# Patient Record
Sex: FEMALE | Race: BLACK OR AFRICAN AMERICAN | Employment: FULL TIME | URBAN - METROPOLITAN AREA
[De-identification: names, ages, dates, MRNs, and addresses within clinical notes are randomized per-mention and may not be internally consistent; named-entity substitution may affect disease eponyms.]

---

## 2023-10-11 ENCOUNTER — EVALUATION (OUTPATIENT)
Dept: PHYSICAL THERAPY | Facility: CLINIC | Age: 48
End: 2023-10-11
Payer: COMMERCIAL

## 2023-10-11 DIAGNOSIS — S83.411D SPRAIN OF MEDIAL COLLATERAL LIGAMENT OF RIGHT KNEE, SUBSEQUENT ENCOUNTER: ICD-10-CM

## 2023-10-11 DIAGNOSIS — S83.411A MCL SPRAIN OF RIGHT KNEE: Primary | ICD-10-CM

## 2023-10-11 PROCEDURE — 97161 PT EVAL LOW COMPLEX 20 MIN: CPT | Mod: GP

## 2023-10-11 PROCEDURE — 97110 THERAPEUTIC EXERCISES: CPT | Mod: GP

## 2023-10-11 ASSESSMENT — ENCOUNTER SYMPTOMS
OCCASIONAL FEELINGS OF UNSTEADINESS: 0
LOSS OF SENSATION IN FEET: 0
DEPRESSION: 0

## 2023-10-11 NOTE — LETTER
October 11, 2023     Patient: Robert MÁRQUEZ   YOB: 1975   Date of Visit: 10/11/2023       To Whom it May Concern:    Robert MÁRQUEZ was seen in my clinic on 10/11/2023. She {Return to school/sport:81351}.    If you have any questions or concerns, please don't hesitate to call.         Sincerely,          Edwar Ma, PT        CC: No Recipients

## 2023-10-11 NOTE — PROGRESS NOTES
Physical Therapy    Physical Therapy Evaluation and Treatment      Patient Name: Robert MÁRQUEZ  MRN: [id number]  Today's Date: 10/11/2023  Time Calculation  Start Time: 0930  Stop Time: 1010  Time Calculation (min): 40 min    Visit: 1    Assessment:  Assessment Comment: Patient presents with signs and symptoms consistent with the medical diagnosis of a right knee MCL sprain. Her pain is negatively effecting her ability to perform weight bearing activity such as ambulation or stairs. She will benefit from medically necessary skilled physical therapy interventions in order to decrease pain and improve function with daily activities.    Plan:  Treatment/Interventions: Cryotherapy, Education/ Instruction, Electrical stimulation, Hot pack, Manual therapy, Neuromuscular re-education, Therapeutic activities, Therapeutic exercises  PT Plan: Skilled PT  PT Frequency: 1 time per week  Duration: 8 weeks  Onset Date: 08/30/23  Number of Treatments Authorized: 30  Rehab Potential: Excellent  Plan of Care Agreement: Patient    Current Problem:   1. MCL sprain of right knee  Follow Up In Physical Therapy    Follow Up In Physical Therapy      2. Sprain of medial collateral ligament of right knee, subsequent encounter [S82.241B]            Subjective    General:  General  Reason for Referral: right knee pain  Referred By: Carlos Jin  General Comment: Patient is a 47 y/o female who was referred to outpatient physical therapy due to right knee pain. Symptoms began on 8/30/23 are she was doing a high impact step aerobics class. Location of right knee pain is along the medial aspect. She notes that she was doing these classes 3 times per week. Patient rates her pain at 2/10 at rest but can get to 7/10 at the worst when the knee stiffens up at night. Patient works as a nurse. She reports that she is not exercising currently. Patient denies any numbness, tingling or recent falls.  Precautions:   None     Pain:    "2-7/10    Prior Level of Function:   Independent with ADLs.     Objective   Cognition:   WNL  General Assessments:  Range of Motion Comments: Left knee ROM: 0-0-132, Right knee ROM 0-0-140    Strength Comments: LE strength (R/L)  Hip flexion 4+/5, 4+/5  Hip extension 4+/5, 4+/5  Hip abduction 4+/5, 4+/5  Knee flexion 4/5, 5/5  Knee extension 4/5, 5/5  Ankle plantarflexion 5/5, 5/5  Ankle dorsiflexion 5/5, 5/5    Palpation Comment: Patient is tender to palpation along the medial joint line of the right knee.   Special Tests Comment: Anterior drawer negative, posterior drawer negative, varus stress negative, valgus stress positive, mcmurrary’s positive  Functional Assessments:  Gait Comment: Patient ambulates with a normalized gait pattern.    Outcome Measures:  LEFS: 61/80    Treatments:  Therapeutic Exercise  Therapeutic Exercise Performed: Yes  Therapeutic Exercise Activity 1: standing gastroc stretch 3 x 30\"  Therapeutic Exercise Activity 2: standing hamstring stretch 3 x 30\"  Therapeutic Exercise Activity 3: bridge 2 x 10 x 3\"  Therapeutic Exercise Activity 4: SLR 2 x 10  Therapeutic Exercise Activity 5: SL clamshells with red TB 2 x 10  Therapeutic Exercise Activity 6: SL hip abduction 2 x 10    Goals:  Encounter Problems       Encounter Problems (Active)       PT Problem       Patient will report improvement via the LEFS to show improved activity tolerance.        Start:  10/11/23    Expected End:  01/09/24            Patient will report compliance with her home exercise program.        Start:  10/11/23    Expected End:  01/09/24            Patient will demonstrate improvements in right LE strength to 5/5 in all motions to decrease pain with weight bearing activity such as stairs and ambulation.        Start:  10/11/23    Expected End:  01/09/24                         "

## 2023-10-11 NOTE — LETTER
October 11, 2023     Patient: Robert MÁRQUEZ   YOB: 1975   Date of Visit: 10/11/2023       To Whom It May Concern:    It is my medical opinion that Robert MÁRQUEZ {Work release (duty restriction):00242}.    If you have any questions or concerns, please don't hesitate to call.         Sincerely,        Edwar Ma, PT    CC: No Recipients

## 2023-10-17 ENCOUNTER — APPOINTMENT (OUTPATIENT)
Dept: PHYSICAL THERAPY | Facility: CLINIC | Age: 48
End: 2023-10-17
Payer: COMMERCIAL

## 2023-10-20 ENCOUNTER — APPOINTMENT (OUTPATIENT)
Dept: PHYSICAL THERAPY | Facility: CLINIC | Age: 48
End: 2023-10-20
Payer: COMMERCIAL

## 2023-10-23 ENCOUNTER — APPOINTMENT (OUTPATIENT)
Dept: PHYSICAL THERAPY | Facility: CLINIC | Age: 48
End: 2023-10-23
Payer: COMMERCIAL

## 2023-10-27 ENCOUNTER — APPOINTMENT (OUTPATIENT)
Dept: PHYSICAL THERAPY | Facility: CLINIC | Age: 48
End: 2023-10-27
Payer: COMMERCIAL

## 2023-10-30 ENCOUNTER — APPOINTMENT (OUTPATIENT)
Dept: PHYSICAL THERAPY | Facility: CLINIC | Age: 48
End: 2023-10-30
Payer: COMMERCIAL

## 2024-06-11 ENCOUNTER — OFFICE VISIT (OUTPATIENT)
Dept: OBSTETRICS AND GYNECOLOGY | Facility: CLINIC | Age: 49
End: 2024-06-11
Payer: COMMERCIAL

## 2024-06-11 VITALS
SYSTOLIC BLOOD PRESSURE: 110 MMHG | BODY MASS INDEX: 31.86 KG/M2 | DIASTOLIC BLOOD PRESSURE: 82 MMHG | HEIGHT: 63 IN | WEIGHT: 179.8 LBS

## 2024-06-11 DIAGNOSIS — R92.8 ABNORMAL MAMMOGRAM OF LEFT BREAST: Primary | ICD-10-CM

## 2024-06-11 DIAGNOSIS — Z01.419 WELL WOMAN EXAM WITH ROUTINE GYNECOLOGICAL EXAM: ICD-10-CM

## 2024-06-11 PROCEDURE — 99386 PREV VISIT NEW AGE 40-64: CPT | Performed by: OBSTETRICS & GYNECOLOGY

## 2024-06-11 ASSESSMENT — PAIN SCALES - GENERAL: PAINLEVEL: 0-NO PAIN

## 2024-06-11 ASSESSMENT — PATIENT HEALTH QUESTIONNAIRE - PHQ9
2. FEELING DOWN, DEPRESSED OR HOPELESS: NOT AT ALL
SUM OF ALL RESPONSES TO PHQ9 QUESTIONS 1 AND 2: 0
1. LITTLE INTEREST OR PLEASURE IN DOING THINGS: NOT AT ALL

## 2024-06-11 ASSESSMENT — ENCOUNTER SYMPTOMS
OCCASIONAL FEELINGS OF UNSTEADINESS: 0
LOSS OF SENSATION IN FEET: 0
DEPRESSION: 0

## 2024-06-11 NOTE — PROGRESS NOTES
Subjective   Robert Antony is a 48 y.o.  female who presents for annual exam. The patient has no complaints today. The patient is sexually active. GYN screening history: last pap: was normal. The patient is taking hormone replacement therapy. Patient denies post-menopausal vaginal bleeding.. The patient wears seatbelts: yes. The patient participates in regular exercise: yes. Has the patient ever been transfused or tattooed?: not asked. The patient reports that there is not domestic violence in their life.  Hysterectomu 2019 due to AUB, adenomyosis, endometriosis and fibroids.  No pain.  Had mammogram from [facility], needs additional imaging.     Menstrual History:  OB History          4    Para   3    Term                AB   1    Living   3         SAB        IAB   1    Ectopic        Multiple        Live Births   3                No LMP recorded. Patient has had a hysterectomy.         History reviewed. No pertinent past medical history.    Past Surgical History:   Procedure Laterality Date    BREAST SURGERY Bilateral 2020    breast augmentation    HYSTERECTOMY          No Known Allergies      Family History   Problem Relation Name Age of Onset    No Known Problems Mother      No Known Problems Father      Breast cancer Maternal Grandmother  73        Social History     Socioeconomic History    Marital status: Single     Spouse name: Not on file    Number of children: 3    Years of education: Not on file    Highest education level: Not on file   Occupational History    Not on file   Tobacco Use    Smoking status: Never    Smokeless tobacco: Never   Vaping Use    Vaping status: Never Used   Substance and Sexual Activity    Alcohol use: Not Currently    Drug use: Never    Sexual activity: Yes     Birth control/protection: Female Sterilization   Other Topics Concern    Not on file   Social History Narrative    Not on file     Social Determinants of Health     Financial  "Resource Strain: Low Risk  (7/15/2020)    Received from [facility]    Overall Financial Resource Strain (CARDIA)     Difficulty of Paying Living Expenses: Not hard at all   Food Insecurity: Unknown (7/15/2020)    Received from [facility]    Hunger Vital Sign     Worried About Running Out of Food in the Last Year: Patient declined     Ran Out of Food in the Last Year: Patient declined   Transportation Needs: Unknown (7/15/2020)    Received from [facility]    PRAPARE - Transportation     Lack of Transportation (Medical): Patient declined     Lack of Transportation (Non-Medical): Patient declined   Physical Activity: Not on file   Stress: Not on file   Social Connections: Not on file   Intimate Partner Violence: Not on file   Housing Stability: Not on file            Objective   /82   Ht 1.6 m (5' 3\")   Wt 81.6 kg (179 lb 12.8 oz)   BMI 31.85 kg/m²     Physical Exam  Vitals and nursing note reviewed.   Constitutional:       General: She is not in acute distress.     Appearance: Normal appearance. She is not ill-appearing or toxic-appearing.   HENT:      Head: Normocephalic and atraumatic.      Mouth/Throat:      Mouth: Mucous membranes are moist.      Pharynx: Oropharynx is clear.   Eyes:      Extraocular Movements: Extraocular movements intact.      Conjunctiva/sclera: Conjunctivae normal.      Pupils: Pupils are equal, round, and reactive to light.   Neck:      Thyroid: No thyroid mass, thyromegaly or thyroid tenderness.   Cardiovascular:      Rate and Rhythm: Normal rate.      Pulses: Normal pulses.      Heart sounds: Normal heart sounds. No murmur heard.  Pulmonary:      Effort: Pulmonary effort is normal. No respiratory distress.      Breath sounds: Normal breath sounds. No wheezing or rhonchi.   Chest:   Breasts:     Right: Normal. No swelling, bleeding, inverted nipple, mass, nipple discharge, skin change or tenderness.      Left: Normal. No swelling, bleeding, inverted nipple, mass, " nipple discharge, skin change or tenderness.   Abdominal:      General: Bowel sounds are normal. There is no distension.      Palpations: Abdomen is soft. There is no mass.      Tenderness: There is no abdominal tenderness. There is no guarding or rebound.      Hernia: No hernia is present. There is no hernia in the left inguinal area or right inguinal area.   Genitourinary:     General: Normal vulva.      Pubic Area: No rash.       Labia:         Right: No rash, tenderness, lesion or injury.         Left: No rash, tenderness, lesion or injury.       Urethra: No prolapse or urethral lesion.      Vagina: No signs of injury. No vaginal discharge, erythema, tenderness, bleeding, lesions or prolapsed vaginal walls.      Comments: S/p hysterectomy, vaginal cuff intact, good support.  No pelvic organ prolapse, cervix and uterus absent.  Adnexa not palpable.  Musculoskeletal:         General: No swelling, tenderness or deformity. Normal range of motion.   Lymphadenopathy:      Upper Body:      Right upper body: No axillary adenopathy.      Left upper body: No axillary adenopathy.      Lower Body: No right inguinal adenopathy. No left inguinal adenopathy.   Skin:     General: Skin is warm and dry.      Findings: No lesion.   Neurological:      General: No focal deficit present.      Mental Status: She is alert and oriented to person, place, and time.      Motor: No weakness.      Coordination: Coordination normal.   Psychiatric:         Mood and Affect: Mood normal.         Behavior: Behavior normal.         Thought Content: Thought content normal.         Judgment: Judgment normal.            Assessment/Plan   Problem List Items Addressed This Visit    None  Visit Diagnoses       Abnormal mammogram of left breast    -  Primary    Relevant Orders    BI mammo bilateral diagnostic tomosynthesis    Well woman exam with routine gynecological exam                   All questions answered.  Breast self exam technique reviewed and  patient encouraged to perform self-exam monthly.  Diagnosis explained in detail, including differential.  Discussed healthy lifestyle modifications.

## 2024-06-18 ENCOUNTER — HOSPITAL ENCOUNTER (OUTPATIENT)
Dept: RADIOLOGY | Facility: CLINIC | Age: 49
Discharge: HOME | End: 2024-06-18
Payer: COMMERCIAL

## 2024-06-18 DIAGNOSIS — R92.8 ABNORMAL MAMMOGRAM OF LEFT BREAST: ICD-10-CM

## 2024-06-27 ENCOUNTER — HOSPITAL ENCOUNTER (OUTPATIENT)
Dept: RADIOLOGY | Facility: EXTERNAL LOCATION | Age: 49
Discharge: HOME | End: 2024-06-27

## 2024-06-27 ENCOUNTER — HOSPITAL ENCOUNTER (OUTPATIENT)
Dept: RADIOLOGY | Facility: CLINIC | Age: 49
Discharge: HOME | End: 2024-06-27
Payer: COMMERCIAL

## 2024-06-27 VITALS — WEIGHT: 179.9 LBS | HEIGHT: 63 IN | BODY MASS INDEX: 31.88 KG/M2

## 2024-06-27 DIAGNOSIS — R92.8 ABNORMAL MAMMOGRAM OF LEFT BREAST: ICD-10-CM

## 2024-06-27 PROCEDURE — 77061 BREAST TOMOSYNTHESIS UNI: CPT | Mod: LT

## 2024-06-27 PROCEDURE — 77065 DX MAMMO INCL CAD UNI: CPT | Mod: LEFT SIDE | Performed by: STUDENT IN AN ORGANIZED HEALTH CARE EDUCATION/TRAINING PROGRAM

## 2024-06-27 PROCEDURE — 77061 BREAST TOMOSYNTHESIS UNI: CPT | Mod: LEFT SIDE | Performed by: STUDENT IN AN ORGANIZED HEALTH CARE EDUCATION/TRAINING PROGRAM

## 2024-08-19 ENCOUNTER — LAB REQUISITION (OUTPATIENT)
Dept: LAB | Facility: HOSPITAL | Age: 49
End: 2024-08-19
Payer: COMMERCIAL

## 2024-08-19 DIAGNOSIS — R35.0 FREQUENCY OF MICTURITION: ICD-10-CM

## 2024-08-19 PROCEDURE — 87661 TRICHOMONAS VAGINALIS AMPLIF: CPT

## 2024-08-19 PROCEDURE — 87491 CHLMYD TRACH DNA AMP PROBE: CPT

## 2024-08-19 PROCEDURE — 87086 URINE CULTURE/COLONY COUNT: CPT

## 2024-08-19 PROCEDURE — 87591 N.GONORRHOEAE DNA AMP PROB: CPT

## 2024-08-21 LAB
BACTERIA UR CULT: NORMAL
C TRACH RRNA SPEC QL NAA+PROBE: NEGATIVE
N GONORRHOEA DNA SPEC QL PROBE+SIG AMP: NEGATIVE
T VAGINALIS RRNA SPEC QL NAA+PROBE: NEGATIVE